# Patient Record
Sex: MALE | Race: ASIAN | Employment: STUDENT | URBAN - METROPOLITAN AREA
[De-identification: names, ages, dates, MRNs, and addresses within clinical notes are randomized per-mention and may not be internally consistent; named-entity substitution may affect disease eponyms.]

---

## 2018-09-14 ENCOUNTER — HOSPITAL ENCOUNTER (EMERGENCY)
Facility: CLINIC | Age: 23
Discharge: HOME | End: 2018-09-14
Attending: PSYCHIATRY & NEUROLOGY | Admitting: PSYCHIATRY & NEUROLOGY
Payer: COMMERCIAL

## 2018-09-14 VITALS
TEMPERATURE: 97.8 F | OXYGEN SATURATION: 97 % | WEIGHT: 191 LBS | DIASTOLIC BLOOD PRESSURE: 82 MMHG | SYSTOLIC BLOOD PRESSURE: 133 MMHG | HEART RATE: 77 BPM | RESPIRATION RATE: 16 BRPM

## 2018-09-14 DIAGNOSIS — F34.1 PERSISTENT DEPRESSIVE DISORDER: ICD-10-CM

## 2018-09-14 PROCEDURE — 90791 PSYCH DIAGNOSTIC EVALUATION: CPT

## 2018-09-14 PROCEDURE — 99283 EMERGENCY DEPT VISIT LOW MDM: CPT | Mod: Z6 | Performed by: PSYCHIATRY & NEUROLOGY

## 2018-09-14 PROCEDURE — 99285 EMERGENCY DEPT VISIT HI MDM: CPT | Mod: 25 | Performed by: PSYCHIATRY & NEUROLOGY

## 2018-09-14 RX ORDER — FLUOXETINE 10 MG/1
10 TABLET, FILM COATED ORAL DAILY
Qty: 15 TABLET | Refills: 0 | Status: SHIPPED | OUTPATIENT
Start: 2018-09-14

## 2018-09-14 ASSESSMENT — ENCOUNTER SYMPTOMS
RESPIRATORY NEGATIVE: 1
DECREASED CONCENTRATION: 1
CARDIOVASCULAR NEGATIVE: 1
ENDOCRINE NEGATIVE: 1
NERVOUS/ANXIOUS: 1
GASTROINTESTINAL NEGATIVE: 1
DYSPHORIC MOOD: 0
NEUROLOGICAL NEGATIVE: 1
AGITATION: 0
EYES NEGATIVE: 1
MUSCULOSKELETAL NEGATIVE: 1
HEMATOLOGIC/LYMPHATIC NEGATIVE: 1
HALLUCINATIONS: 0
HYPERACTIVE: 0
ACTIVITY CHANGE: 1

## 2018-09-14 NOTE — ED AVS SNAPSHOT
[facility], [place], Emergency Department    [address]    [place] [address]    Phone:  [phone]    Fax:  [phone]                                       Ino Akins   MRN: [id number]    Department:  [facility], Emergency Department   Date of Visit:  9/14/2018           After Visit Summary Signature Page     I have received my discharge instructions, and my questions have been answered. I have discussed any challenges I see with this plan with the nurse or doctor.    ..........................................................................................................................................  Patient/Patient Representative Signature      ..........................................................................................................................................  Patient Representative Print Name and Relationship to Patient    ..................................................               ................................................  Date                                   Time    ..........................................................................................................................................  Reviewed by Signature/Title    ...................................................              ..............................................  Date                                               Time          22EPIC Rev 08/18

## 2018-09-14 NOTE — ED NOTES
Bed: [facility]  Expected date:   Expected time:   Means of arrival:   Comments:  Jennifer 89, 23 y/o male suicidal, 20 min

## 2018-09-14 NOTE — ED AVS SNAPSHOT
[facility], Emergency Department    [address]    [place] [address]    Phone:  [phone]    Fax:  [phone]                                       Ino Akins   MRN: [id number]    Department:  [facility], Emergency Department   Date of Visit:  9/14/2018           Patient Information     Date Of Birth          1995        Your diagnoses for this visit were:     Persistent depressive disorder        You were seen by Kaushal Snell MD.      Follow-up Information     Follow up with No Ref-Primary, Physician.        Discharge Instructions       Start fluoxetine (Prozac) to address your mood symptoms. You are given 2 weeks supply.  Please see the med provider at [place] next week for continued monitoring and med management  You would benefit from counseling. Work with a counselor through [facility] or through [facility]. [facility] is also a nearby counseling service.  Follow-up [facility] care and services    24 Hour Appointment Hotline       To make an appointment at any [facility] clinic, call [phone] ([phone]). If you don't have a family doctor or clinic, we will help you find one. [facility] clinics are conveniently located to serve the needs of you and your family.             Review of your medicines      START taking        Dose / Directions Last dose taken    FLUoxetine 10 MG tablet   Commonly known as:  PROzac   Dose:  10 mg   Quantity:  15 tablet        Take 1 tablet (10 mg) by mouth daily   Refills:  0                Prescriptions were sent or printed at these locations (1 Prescription)                   Other Prescriptions                Printed at Department/Unit printer (1 of 1)         FLUoxetine (PROZAC) 10 MG tablet                Orders Needing Specimen Collection     Ordered          09/14/18 8002  Drug abuse screen 6 urine (chem dep) - STAT, [place]: STAT, Needs to be Collected     Scheduled Task Status   09/14/18 3663 Collect Drug abuse  "screen 6 urine (chem dep) Open   Order Class:  PCU Collect                  Pending Results     No orders found from 2018 to 9/15/2018.            Pending Culture Results     No orders found from 2018 to 9/15/2018.            Pending Results Instructions     If you had any lab results that were not finalized at the time of your Discharge, you can call the ED Lab Result RN at [phone]. You will be contacted by this team for any positive Lab results or changes in treatment. The nurses are available 7 days a week from 10A to 6:30P.  You can leave a message 24 hours per day and they will return your call.        Thank you for choosing [facility]       Thank you for choosing [facility] for your care. Our goal is always to provide you with excellent care. Hearing back from our patients is one way we can continue to improve our services. Please take a few minutes to complete the written survey that you may receive in the mail after you visit with us. Thank you!        [employer] Information     [employer] lets you send messages to your doctor, view your test results, renew your prescriptions, schedule appointments and more. To sign up, go to [email or web address] . Click on \"Log in\" on the left side of the screen, which will take you to the Welcome page. Then click on \"Sign up Now\" on the right side of the page.     You will be asked to enter the access code listed below, as well as some personal information. Please follow the directions to create your username and password.     Your access code is: [email or web address]  Expires: 2018  7:25 PM     Your access code will  in 90 days. If you need help or a new code, please call your [facility] clinic or [phone].        Care EveryWhere ID     This is your Care EveryWhere ID. This could be used by other organizations to access your [facility] medical records  [id number]        Equal Access to Services     LUIS ANTONIO ROBERTSON AH: abigail High " gogo barton waxay idiin hayaan adeeg kharash la'aan ah. So [facility] [phone].    ATENCIÓN: Si habla sasha, tiene a randle disposición servicios gratuitos de asistencia lingüística. Llame al [phone].    We comply with applicable federal civil rights laws and [place] laws. We do not discriminate on the basis of race, color, national origin, age, disability, sex, sexual orientation, or gender identity.            After Visit Summary       This is your record. Keep this with you and show to your community pharmacist(s) and doctor(s) at your next visit.

## 2018-09-14 NOTE — ED NOTES
From [facility] at mental health, depressed, suicidal ideation without plan, voluntary

## 2018-09-15 NOTE — DISCHARGE INSTRUCTIONS
Start fluoxetine (Prozac) to address your mood symptoms. You are given 2 weeks supply.  Please see the med provider at [facility] next week for continued monitoring and med management  You would benefit from counseling. Work with a counselor through [facility] or through [facility]. [facility] is also a nearby counseling service.  Follow-up established care and services

## 2018-09-15 NOTE — ED PROVIDER NOTES
History     Chief Complaint   Patient presents with     Suicidal     Here from clinic, suicidal, cooperative in route.     The history is provided by the patient and medical records.     Ino Akins is a 22 year old male who is here sent over from [facility] where he had a walk-in counseling session. Patient is a student at the [employer]. He lives in a dorm with a roommate. Patient started struggling with his motivation last spring. He missed some classes but still managed to fulfill his assignments. He continues to struggle with his lack of motivation since starting school this fall. He now is on academic probation. He has been isolating. He has minimal support. He put forth a lot of effort to come to [facility] seeking help. He was hoping to get started on a medication. He was scheduled to see a med provider in 3 days but he feels he needs to get started on one immediately. He disclosed passive SI. Patient reports feeling depressed and passively suicidal since high school. He has no history of suicide attempt. He has never been hospitalized psychiatrically. Patient was expecting that he was sent here to get started on a medication. He is denying intent of ending his life. He does not feel he needs admission. He does not want day treatment. He intends on returning to his dorm. He has a poor relationship with parents and does not want them included in his care. Patient reports that he will follow-up at [facility], but would appreciate starting on a psychotropic presently. He has never been on an antidepressant. There is no family history of anyone trying a psychotropic.    Please see [facility] Crisis Assessment on 9/14/18 in [facility] for further details.    PERSONAL MEDICAL HISTORY  No past medical history on file.  PAST SURGICAL HISTORY  No past surgical history on file.  FAMILY HISTORY  No family history on file.  SOCIAL HISTORY  Social History   Substance Use Topics     Smoking status: Not on file     Smokeless tobacco: Not on  file     Alcohol use Not on file     MEDICATIONS  No current facility-administered medications for this encounter.      Current Outpatient Prescriptions   Medication     FLUoxetine (PROZAC) 10 MG tablet     ALLERGIES  Not on File      I have reviewed the Medications, Allergies, Past Medical and Surgical History, and Social History in the Epic system.    Review of Systems   Constitutional: Positive for activity change.   HENT: Negative.    Eyes: Negative.    Respiratory: Negative.    Cardiovascular: Negative.    Gastrointestinal: Negative.    Endocrine: Negative.    Genitourinary: Negative.    Musculoskeletal: Negative.    Skin: Negative.    Neurological: Negative.    Hematological: Negative.    Psychiatric/Behavioral: Positive for decreased concentration and suicidal ideas. Negative for agitation, behavioral problems, dysphoric mood and hallucinations. The patient is nervous/anxious. The patient is not hyperactive.    All other systems reviewed and are negative.      Physical Exam   BP: 142/87  Pulse: 74  Temp: 97.8  F (36.6  C)  Resp: 16  Weight: 86.6 kg (191 lb)  SpO2: 96 %      Physical Exam   Constitutional: He appears well-developed and well-nourished.   HENT:   Head: Normocephalic.   Eyes: Pupils are equal, round, and reactive to light.   Neck: Normal range of motion.   Cardiovascular: Normal rate.    Pulmonary/Chest: Effort normal.   Abdominal: Soft.   Musculoskeletal: Normal range of motion.   Neurological: He is alert.   Skin: Skin is warm.   Psychiatric: His speech is normal. Judgment and thought content normal. He is withdrawn. He is not agitated, not aggressive, not hyperactive, not actively hallucinating and not combative. Thought content is not paranoid and not delusional. Cognition and memory are normal. He exhibits a depressed mood. He expresses no homicidal and no suicidal ideation. He is inattentive.   Nursing note and vitals reviewed.      ED Course     ED Course     Procedures    Labs Ordered and  Resulted from Time of ED Arrival Up to the Time of Departure from the ED - No data to display         Assessments & Plan (with Medical Decision Making)   Patient with persistent depression who feels he needs to start an antidepressant as he has been struggling with his academics. He reports determination to be safe. I do not find him holdable. Patient was prescribed prozac 10 mg daily. He was given 15 tablets and encouraged to follow-up with [facility]. [facility] provided resources for [facility].    I have reviewed the nursing notes.    I have reviewed the findings, diagnosis, plan and need for follow up with the patient.    New Prescriptions    FLUOXETINE (PROZAC) 10 MG TABLET    Take 1 tablet (10 mg) by mouth daily       Final diagnoses:   Persistent depressive disorder       9/14/2018   [facility], [place], EMERGENCY DEPARTMENT     Kaushal Snell MD  09/14/18 7795